# Patient Record
Sex: MALE | Race: WHITE | NOT HISPANIC OR LATINO | URBAN - METROPOLITAN AREA
[De-identification: names, ages, dates, MRNs, and addresses within clinical notes are randomized per-mention and may not be internally consistent; named-entity substitution may affect disease eponyms.]

---

## 2020-07-25 ENCOUNTER — TELEPHONE ENCOUNTER (OUTPATIENT)
Dept: URBAN - METROPOLITAN AREA CLINIC 13 | Facility: CLINIC | Age: 50
End: 2020-07-25

## 2020-07-25 RX ORDER — SPIRONOLACTONE 100 MG/1
TAKE 1 TABLET DAILY TABLET, FILM COATED ORAL
Qty: 30 | Refills: 1 | OUTPATIENT
Start: 2019-08-30 | End: 2019-10-08

## 2020-07-25 RX ORDER — PANTOPRAZOLE SODIUM 40 MG/1
TAKE 1 TABLET TWICE DAILY TABLET, DELAYED RELEASE ORAL
Refills: 11 | OUTPATIENT
End: 2019-08-30

## 2020-07-25 RX ORDER — LORAZEPAM 0.5 MG/1
TAKE 1 TABLET TWICE DAILY AS NEEDED TABLET ORAL
Refills: 0 | OUTPATIENT
End: 2019-10-08

## 2020-07-25 RX ORDER — FUROSEMIDE 20 MG/1
TAKE 1 TABLET DAILY TABLET ORAL
Refills: 0 | OUTPATIENT
End: 2019-08-30

## 2020-07-25 RX ORDER — FUROSEMIDE 40 MG/1
TAKE 1 TABLET BY MOUTH DAILY TABLET ORAL
Qty: 30 | Refills: 1 | OUTPATIENT
Start: 2019-08-30 | End: 2019-10-08

## 2020-07-25 RX ORDER — PROPRANOLOL HYDROCHLORIDE 10 MG/1
TAKE 1 TABLET TWICE DAILY TABLET ORAL
Refills: 0 | OUTPATIENT
End: 2019-08-30

## 2020-07-26 ENCOUNTER — TELEPHONE ENCOUNTER (OUTPATIENT)
Dept: URBAN - METROPOLITAN AREA CLINIC 13 | Facility: CLINIC | Age: 50
End: 2020-07-26

## 2020-07-26 RX ORDER — SPIRONOLACTONE 50 MG/1
TAKE 1 TABLET DAILY TABLET, FILM COATED ORAL
Qty: 90 | Refills: 3 | Status: ACTIVE | COMMUNITY
Start: 2019-10-08

## 2020-07-26 RX ORDER — FUROSEMIDE 20 MG/1
TAKE 1 TABLET DAILY TABLET ORAL
Qty: 90 | Refills: 3 | Status: ACTIVE | COMMUNITY
Start: 2019-10-08

## 2021-05-18 ENCOUNTER — TELEPHONE ENCOUNTER (OUTPATIENT)
Dept: URBAN - METROPOLITAN AREA CLINIC 113 | Facility: CLINIC | Age: 51
End: 2021-05-18

## 2021-05-25 ENCOUNTER — CLAIMS CREATED FROM THE CLAIM WINDOW (OUTPATIENT)
Dept: URBAN - METROPOLITAN AREA MEDICAL CENTER 2 | Facility: MEDICAL CENTER | Age: 51
End: 2021-05-25
Payer: COMMERCIAL

## 2021-05-25 DIAGNOSIS — K70.11 ALCOHOLIC HEPATITIS WITH ASCITES: ICD-10-CM

## 2021-05-25 DIAGNOSIS — K70.31 ALCOHOLIC CIRRHOSIS OF LIVER WITH ASCITES: ICD-10-CM

## 2021-05-25 PROCEDURE — 99222 1ST HOSP IP/OBS MODERATE 55: CPT | Performed by: INTERNAL MEDICINE

## 2021-05-26 ENCOUNTER — CLAIMS CREATED FROM THE CLAIM WINDOW (OUTPATIENT)
Dept: URBAN - METROPOLITAN AREA MEDICAL CENTER 2 | Facility: MEDICAL CENTER | Age: 51
End: 2021-05-26
Payer: COMMERCIAL

## 2021-05-26 DIAGNOSIS — K70.31 ALCOHOLIC CIRRHOSIS OF LIVER WITH ASCITES: ICD-10-CM

## 2021-05-26 DIAGNOSIS — K40.90 UNILATERAL INGUINAL HERNIA, WITHOUT OBSTRUCTION OR GANGRENE, NOT SPECIFIED AS RECURRENT: ICD-10-CM

## 2021-05-26 DIAGNOSIS — K70.11 ALCOHOLIC HEPATITIS WITH ASCITES: ICD-10-CM

## 2021-05-26 PROCEDURE — 99232 SBSQ HOSP IP/OBS MODERATE 35: CPT | Performed by: INTERNAL MEDICINE

## 2021-05-27 ENCOUNTER — CLAIMS CREATED FROM THE CLAIM WINDOW (OUTPATIENT)
Dept: URBAN - METROPOLITAN AREA MEDICAL CENTER 2 | Facility: MEDICAL CENTER | Age: 51
End: 2021-05-27
Payer: COMMERCIAL

## 2021-05-27 DIAGNOSIS — K70.11 ALCOHOLIC HEPATITIS WITH ASCITES: ICD-10-CM

## 2021-05-27 DIAGNOSIS — K70.31 ALCOHOLIC CIRRHOSIS OF LIVER WITH ASCITES: ICD-10-CM

## 2021-05-27 DIAGNOSIS — K40.90 UNILATERAL INGUINAL HERNIA, WITHOUT OBSTRUCTION OR GANGRENE, NOT SPECIFIED AS RECURRENT: ICD-10-CM

## 2021-05-27 PROCEDURE — 99232 SBSQ HOSP IP/OBS MODERATE 35: CPT | Performed by: INTERNAL MEDICINE

## 2021-05-28 ENCOUNTER — CLAIMS CREATED FROM THE CLAIM WINDOW (OUTPATIENT)
Dept: URBAN - METROPOLITAN AREA MEDICAL CENTER 2 | Facility: MEDICAL CENTER | Age: 51
End: 2021-05-28
Payer: COMMERCIAL

## 2021-05-28 DIAGNOSIS — K40.90 UNILATERAL INGUINAL HERNIA, WITHOUT OBSTRUCTION OR GANGRENE, NOT SPECIFIED AS RECURRENT: ICD-10-CM

## 2021-05-28 DIAGNOSIS — K70.11 ALCOHOLIC HEPATITIS WITH ASCITES: ICD-10-CM

## 2021-05-28 DIAGNOSIS — K70.31 ALCOHOLIC CIRRHOSIS OF LIVER WITH ASCITES: ICD-10-CM

## 2021-05-28 PROCEDURE — 99232 SBSQ HOSP IP/OBS MODERATE 35: CPT | Performed by: INTERNAL MEDICINE

## 2021-06-22 ENCOUNTER — TELEPHONE ENCOUNTER (OUTPATIENT)
Dept: URBAN - METROPOLITAN AREA CLINIC 113 | Facility: CLINIC | Age: 51
End: 2021-06-22

## 2021-06-22 ENCOUNTER — OFFICE VISIT (OUTPATIENT)
Dept: URBAN - METROPOLITAN AREA CLINIC 113 | Facility: CLINIC | Age: 51
End: 2021-06-22
Payer: COMMERCIAL

## 2021-06-22 ENCOUNTER — WEB ENCOUNTER (OUTPATIENT)
Dept: URBAN - METROPOLITAN AREA CLINIC 113 | Facility: CLINIC | Age: 51
End: 2021-06-22

## 2021-06-22 ENCOUNTER — DASHBOARD ENCOUNTERS (OUTPATIENT)
Age: 51
End: 2021-06-22

## 2021-06-22 VITALS
BODY MASS INDEX: 24.92 KG/M2 | HEART RATE: 86 BPM | SYSTOLIC BLOOD PRESSURE: 126 MMHG | HEIGHT: 72 IN | DIASTOLIC BLOOD PRESSURE: 73 MMHG | TEMPERATURE: 98.4 F | RESPIRATION RATE: 18 BRPM | WEIGHT: 184 LBS

## 2021-06-22 DIAGNOSIS — K62.5 BRIGHT RED BLOOD PER RECTUM: ICD-10-CM

## 2021-06-22 DIAGNOSIS — K64.8 OTHER HEMORRHOIDS: ICD-10-CM

## 2021-06-22 DIAGNOSIS — K70.31 ALCOHOLIC CIRRHOSIS OF LIVER WITH ASCITES: ICD-10-CM

## 2021-06-22 DIAGNOSIS — K70.11 ALCOHOLIC HEPATITIS WITH ASCITES: ICD-10-CM

## 2021-06-22 PROBLEM — 420054005: Status: ACTIVE | Noted: 2021-06-22

## 2021-06-22 PROBLEM — 70153002: Status: ACTIVE | Noted: 2021-06-22

## 2021-06-22 PROBLEM — 1082611000119101: Status: ACTIVE | Noted: 2021-06-22

## 2021-06-22 PROBLEM — 74474003: Status: ACTIVE | Noted: 2021-06-22

## 2021-06-22 PROCEDURE — 99204 OFFICE O/P NEW MOD 45 MIN: CPT | Performed by: INTERNAL MEDICINE

## 2021-06-22 RX ORDER — SPIRONOLACTONE 100 MG/1
TAKE 1 TABLET DAILY TABLET, FILM COATED ORAL
Qty: 90 | Refills: 3 | OUTPATIENT
Start: 2019-10-08 | End: 2021-06-22

## 2021-06-22 RX ORDER — RIFAXIMIN 550 MG/1
1 TABLET TABLET ORAL TWICE A DAY
Status: ACTIVE | COMMUNITY

## 2021-06-22 RX ORDER — RIFAXIMIN 550 MG/1
1 TABLET TABLET ORAL TWICE A DAY
Qty: 180 TABLET | Refills: 3

## 2021-06-22 RX ORDER — FUROSEMIDE 40 MG/1
TAKE 1 TABLET DAILY TABLET ORAL
Qty: 90 | Refills: 3 | OUTPATIENT
Start: 2019-10-08

## 2021-06-22 RX ORDER — PREDNISONE 10 MG/1
1 TABLET TABLET ORAL ONCE A DAY
Status: ACTIVE | COMMUNITY

## 2021-06-22 RX ORDER — FUROSEMIDE 20 MG/1
TAKE 1 TABLET DAILY TABLET ORAL
Qty: 90 | Refills: 3 | COMMUNITY
Start: 2019-10-08 | End: 2021-06-22

## 2021-06-22 RX ORDER — SPIRONOLACTONE 50 MG/1
TAKE 1 TABLET DAILY TABLET, FILM COATED ORAL
Qty: 90 | Refills: 3 | Status: ACTIVE | COMMUNITY
Start: 2019-10-08

## 2021-06-22 RX ORDER — FUROSEMIDE 20 MG/1
TAKE 1 TABLET DAILY TABLET ORAL
Qty: 90 | Refills: 3 | Status: ACTIVE | COMMUNITY
Start: 2019-10-08

## 2021-06-22 RX ORDER — FUROSEMIDE 20 MG/1
1 TABLET TABLET ORAL ONCE A DAY
Qty: 90 TABLET | Refills: 3 | OUTPATIENT
Start: 2021-06-22

## 2021-06-22 RX ORDER — HYDROCORTISONE 25 MG/G
1 APPLICATION CREAM TOPICAL TWICE A DAY
Qty: 1 TUBE | Refills: 0 | OUTPATIENT
Start: 2021-06-22 | End: 2021-07-02

## 2021-06-22 RX ORDER — LACTULOSE 10 G/15ML
15 ML SOLUTION ORAL
Status: ACTIVE | COMMUNITY

## 2021-06-22 RX ORDER — SPIRONOLACTONE 50 MG/1
1 TABLET TABLET, FILM COATED ORAL ONCE A DAY
Qty: 90 TABLET | Refills: 3 | OUTPATIENT
Start: 2021-06-22 | End: 2022-06-17

## 2021-06-22 RX ORDER — LACTULOSE 10 G/15ML
15 ML SOLUTION ORAL
Qty: 473 ML | Refills: 3

## 2021-06-22 NOTE — HPI-TODAY'S VISIT:
Mr. Romero is a 50-year-old gentleman with a history of alcohol abuse, esophageal varices in the setting of cirrhosis, and alcoholic cirrhosis with ascites, presenting for follow-up after hospitalization.  He presented to the [facility] emergency department on 5/24/2021 with complaints of abdominal swelling and distention.  He was seen in consultation by Jose Bridges PA-C, and Dr. Galdamez on 5/24/2021.  He reported a longstanding history of alcohol abuse, stating he was drinking 8-10 tall boys of beer per day.  At the time of consultation he reported alcohol cessation 12 weeks prior to hospitalization.  It was at that time that his abdomen became very swollen which prompted him to go to the emergency department.  He had a paracentesis performed on 5/23/2021 which was notable for the removal of 2.5 L ascitic fluid.  Labs revealed WBC 4.8, hemoglobin 11.6, hematocrit 33.7, platelets 113.  CMP showed sodium 130, chloride 105, BUN 7, potassium 4.3, creatinine 0.4, T bili 20.8, direct bilirubin 16.3, , ALT 86, alk phos 175, total protein 6.3, albumin 2.7.  PT/INR on 5/25/2021 was 19.1.  His discriminant function score was noted to be 42.  He had a repeat ultrasound-guided paracentesis on 5/27/2021.  3.6 L of fluid was removed and sent for analysis.  Pathology revealed no evidence of malignant cells.  He states that he was also seen in the emergency department last week for abdominal distention and ascites.  He underwent therapeutic paracentesis.  At that time 7.5 L of fluid was tapped. He was discharged on 5/29/2021 and advsied to follow-up with GI.  Today he reports some abdominal distention as well as yellowing of his eyes and hands which he describes as "better than before". He does report some lower pedal edema.  He is compliant with Lasix 40 mg once daily and spironolactone 100 mg once daily.  He is also compliant with lactulose twice daily and is having 2-3 bowel movements. He reports intermittent episodes of bright red blood per rectum which he attributes to hemorrhoidal irritation.  He only notices bright red blood per rectum with increased stool frequency.  He denies melena or hematochezia. Denies any pain with defecation. He does report nocturnal hand and lower extremity muscle cramps.  This was relieved after taking a calcium/magnesium supplement.  He will finish his prednisone next week.  He remains abstinent from alcohol.  He denies any fevers, chills, nausea or vomiting. He is scheduled to see Dr. Gallagher next month for consultation regarding right inguinal hernia. He has never has a colonoscopy in the past.

## 2021-06-22 NOTE — PHYSICAL EXAM EXTREMITIES:
Extremities are without evidence of cyanosis.  Dorsalis pedis pulses are present and adequate.  Bilateral pedal edema.

## 2021-06-22 NOTE — PHYSICAL EXAM NEUROLOGIC:
Oriented to person, place and time,  short and long term memory intact,  sensory exam intact. No asterixis

## 2021-06-22 NOTE — HPI-OTHER HISTORIES
Review of record: EGD (Dr. John 1/5/2020): Grade 3 varices at the lower third of the esophagus.  Successfully banded without bleeding.  Gastritis. EGD (Dr. John 11/8/2019): Grade 2 varices at the lower third of the esophagus.  Successfully banded without bleeding.  Gastritis. EGD (with Dr. John at [facility] for hematemesis, melena 7/21/2019): Grade 3 varices at the middle and lower third of the esophagus.  High risk stigmata with red romario and nipple sign.  Bleeding was noted after first band was placed, but was subsequently eradicated with second banding.

## 2021-06-23 ENCOUNTER — TELEPHONE ENCOUNTER (OUTPATIENT)
Dept: URBAN - METROPOLITAN AREA CLINIC 113 | Facility: CLINIC | Age: 51
End: 2021-06-23

## 2021-06-23 LAB
A/G RATIO: 0.7
ALBUMIN: 2.7
ALKALINE PHOSPHATASE: 291
ALT (SGPT): 123
AST (SGOT): 128
BASO (ABSOLUTE): 0
BASOS: 0
BILIRUBIN, TOTAL: 8.7
BUN/CREATININE RATIO: 20
BUN: 13
CALCIUM: 8.1
CARBON DIOXIDE, TOTAL: 22
CHLORIDE: 99
CREATININE: 0.65
EGFR IF AFRICN AM: 131
EGFR IF NONAFRICN AM: 113
EOS (ABSOLUTE): 0.2
EOS: 2
GLOBULIN, TOTAL: 3.9
GLUCOSE: 80
HEMATOCRIT: 38.8
HEMATOLOGY COMMENTS:: (no result)
HEMOGLOBIN: 13.2
IMMATURE CELLS: (no result)
IMMATURE GRANS (ABS): 0.2
IMMATURE GRANULOCYTES: 2
INR: 1.2
LYMPHS (ABSOLUTE): 0.9
LYMPHS: 11
MCH: 34.3
MCHC: 34
MCV: 101
MONOCYTES(ABSOLUTE): 0.8
MONOCYTES: 9
NEUTROPHILS (ABSOLUTE): 6.2
NEUTROPHILS: 76
NRBC: (no result)
PLATELETS: 105
POTASSIUM: 4.6
PROTEIN, TOTAL: 6.6
PROTHROMBIN TIME: 12.4
RBC: 3.85
RDW: 15
SODIUM: 128
WBC: 8.2

## 2021-06-25 ENCOUNTER — CLAIMS CREATED FROM THE CLAIM WINDOW (OUTPATIENT)
Dept: URBAN - METROPOLITAN AREA MEDICAL CENTER 2 | Facility: MEDICAL CENTER | Age: 51
End: 2021-06-25
Payer: COMMERCIAL

## 2021-06-25 DIAGNOSIS — K74.60 UNSPECIFIED CIRRHOSIS OF LIVER: ICD-10-CM

## 2021-06-25 DIAGNOSIS — R18.8 OTHER ASCITES: ICD-10-CM

## 2021-06-25 DIAGNOSIS — R74.8 ABNORMAL LEVELS OF OTHER SERUM ENZYMES: ICD-10-CM

## 2021-06-25 DIAGNOSIS — R10.9 UNSPECIFIED ABDOMINAL PAIN: ICD-10-CM

## 2021-06-25 PROCEDURE — 992 APS NON BILLABLE: Performed by: INTERNAL MEDICINE

## 2021-06-25 PROCEDURE — 99222 1ST HOSP IP/OBS MODERATE 55: CPT | Performed by: INTERNAL MEDICINE

## 2021-06-26 ENCOUNTER — CLAIMS CREATED FROM THE CLAIM WINDOW (OUTPATIENT)
Dept: URBAN - METROPOLITAN AREA MEDICAL CENTER 2 | Facility: MEDICAL CENTER | Age: 51
End: 2021-06-26
Payer: COMMERCIAL

## 2021-06-26 DIAGNOSIS — K92.1 MELENA: ICD-10-CM

## 2021-06-26 DIAGNOSIS — K92.89 GASTROINTESTINAL DYSMOTILITY: ICD-10-CM

## 2021-06-26 DIAGNOSIS — I85.00 ESOPHAGEAL VARICES WITHOUT BLEEDING: ICD-10-CM

## 2021-06-26 PROCEDURE — 43244 EGD VARICES LIGATION: CPT | Performed by: INTERNAL MEDICINE

## 2021-06-27 ENCOUNTER — CLAIMS CREATED FROM THE CLAIM WINDOW (OUTPATIENT)
Dept: URBAN - METROPOLITAN AREA MEDICAL CENTER 2 | Facility: MEDICAL CENTER | Age: 51
End: 2021-06-27
Payer: COMMERCIAL

## 2021-06-27 DIAGNOSIS — R74.8 ABNORMAL LEVELS OF OTHER SERUM ENZYMES: ICD-10-CM

## 2021-06-27 DIAGNOSIS — K74.60 UNSPECIFIED CIRRHOSIS OF LIVER: ICD-10-CM

## 2021-06-27 DIAGNOSIS — R18.8 OTHER ASCITES: ICD-10-CM

## 2021-06-27 DIAGNOSIS — R10.9 UNSPECIFIED ABDOMINAL PAIN: ICD-10-CM

## 2021-06-27 PROCEDURE — 992 APS NON BILLABLE: Performed by: INTERNAL MEDICINE

## 2021-06-27 PROCEDURE — 99233 SBSQ HOSP IP/OBS HIGH 50: CPT | Performed by: INTERNAL MEDICINE

## 2021-06-28 ENCOUNTER — CLAIMS CREATED FROM THE CLAIM WINDOW (OUTPATIENT)
Dept: URBAN - METROPOLITAN AREA MEDICAL CENTER 2 | Facility: MEDICAL CENTER | Age: 51
End: 2021-06-28
Payer: COMMERCIAL

## 2021-06-28 DIAGNOSIS — R18.8 OTHER ASCITES: ICD-10-CM

## 2021-06-28 DIAGNOSIS — R74.8 ABNORMAL LEVELS OF OTHER SERUM ENZYMES: ICD-10-CM

## 2021-06-28 DIAGNOSIS — K74.60 UNSPECIFIED CIRRHOSIS OF LIVER: ICD-10-CM

## 2021-06-28 DIAGNOSIS — R10.9 UNSPECIFIED ABDOMINAL PAIN: ICD-10-CM

## 2021-06-28 PROCEDURE — 99233 SBSQ HOSP IP/OBS HIGH 50: CPT | Performed by: INTERNAL MEDICINE

## 2021-06-28 PROCEDURE — 992 APS NON BILLABLE: Performed by: INTERNAL MEDICINE

## 2021-07-08 ENCOUNTER — OFFICE VISIT (OUTPATIENT)
Dept: URBAN - METROPOLITAN AREA CLINIC 113 | Facility: CLINIC | Age: 51
End: 2021-07-08